# Patient Record
Sex: FEMALE | URBAN - METROPOLITAN AREA
[De-identification: names, ages, dates, MRNs, and addresses within clinical notes are randomized per-mention and may not be internally consistent; named-entity substitution may affect disease eponyms.]

---

## 2017-10-31 ENCOUNTER — HISTORICAL (OUTPATIENT)
Dept: ADMINISTRATIVE | Facility: HOSPITAL | Age: 62
End: 2017-10-31

## 2022-04-30 NOTE — OP NOTE
Patient:   Rachelle Galeano             MRN: [id number]            FIN: [id number]               Age:   62 years     Sex:  Female     :  1955   Associated Diagnoses:   None   Author:   Herman Emanuel MD       Phacoemulsification of Cataract with Intraocular Implant   Preoperative Diagnosis: Cataract right eye   Postoperative Diagnosis : Cataract right eye  Surgeon: Herman Emanuel MD  Assistant: JOHN Singh  Anestheisa: Topical  Complications: None  After the patient underwent topical anesthesia along with IV sedation in the holding area, the patient was brought to the [facility] laser.  A time out was performed.  The capsulotomy, lens fragmentation and LRI's at 46 & 226 degrees with a length of 15 degrees.  Then the patient was brought to the operating suite. The patient was prepped and draped in a sterile fashion. A pediatric tegaderm and a lid speculum were used to retract the upper and lower lashes and lids.  A 1.0mm paracentesis was then made at the 11 oclock position. Intraocular non-preserved 1% Xylocaine (4% diluted down to 1%) was irrigated into the anterior chamber. Endocoat was injected into the eye. A clear corneal incision was made with a 2.4 Keratome blade. BSS was used to hydro dissect the nucleus from the capsule. The nucleus was then phacoemulsified with the Abbott machine for a EFX of 2. The cortex was then removed with the I/A hand piece and Helon was placed into the posterior bag of the eye. An posterior chamber implant ZXR00 of power 18.5 was placed in the capsular bag. The Helon was then removed from the eye with the I/A hand piece . The anterior chamber was inflated with BSS and the wound was checked for leaks. The lid speculum was removed and a drop of Besivance was placed in the operative eye. The patient was brought to the recovery suite in stable condition.         10/31/2017 @ [facility]